# Patient Record
(demographics unavailable — no encounter records)

---

## 2017-10-19 NOTE — RAD
INDICATION: Wrist pain     



COMPARISON: None



TECHNIQUE: AP, lateral, and oblique views were obtained.



FINDINGS: The bony structures, joint spaces, and soft tissues are normal for age.



IMPRESSION: NEGATIVE EXAMINATION.

## 2017-10-19 NOTE — UC
Upper Extremity HPI





- HPI Summary


HPI Summary: 





7:40 AM PULLING LINEN OUT OF CAR. FELT POP IN LEFT SHOULDER, AND LEFT WRIST. 

PAIN WITH MOVEMENT,ALONG SCAPULA,WITH  EXTENSION OF SHOULDER





- History of Current Complaint


Chief Complaint: UCUpperExtremity


Stated Complaint: LEFT SHOULDER INJURY (WC)


Time Seen by Provider: 10/19/17 14:02


Hx Obtained From: Patient


Hx Last Menstrual Period: unknown


Onset/Duration: Sudden Onset, Lasting Hours


Severity Initially: Moderate


Severity Currently: Moderate


Pain Intensity: 8


Pain Scale Used: 0-10 Numeric


Location Of Pain: Is Discrete @ - LEFT SHOUDLER, LEFT WRIST


Character: Spasmodic, Stiffness


Aggravating Factor(s): Movement, Extension, Internal/External Rotation


Alleviating Factor(s): Nothing


Associated Signs And Symptoms: Positive: Negative


Related History: Dominant Hand Right





- Risk Factors


Non-Orthopedic Risk Factor: Negative


DVT Risk Factors: Negative


Septic Arthritis Risk Factor: Negative





- Allergies/Home Medications


Allergies/Adverse Reactions: 


 Allergies











Allergy/AdvReac Type Severity Reaction Status Date / Time


 


No Known Allergies Allergy   Verified 10/19/17 14:00














PMH/Surg Hx/FS Hx/Imm Hx


Previously Healthy: Yes





- Surgical History


Surgical History: Yes


Surgery Procedure, Year, and Place: umbilical hernia repair 2007





- Family History


Known Family History: 


   Negative: Other - NO JOINT LAXITY





- Social History


Occupation: Employed Full-time


Lives: With Family


Alcohol Use: None


Substance Use Type: Excessive Caffeine


Smoking Status (MU): Heavy Every Day Tobacco Smoker


Type: Cigarettes


Amount Used/How Often: 1ppd


Cessation Counseling: Patient Advised to Stop





Review of Systems


Constitutional: Negative


Skin: Negative


Eyes: Negative


ENT: Negative


Respiratory: Negative


Cardiovascular: Negative


Gastrointestinal: Negative


Genitourinary: Negative


Motor: Negative


Neurovascular: Negative


Musculoskeletal: Arthralgia, Myalgia


Neurological: Negative


Psychological: Negative


Is Patient Immunocompromised?: No


All Other Systems Reviewed And Are Negative: Yes





Physical Exam


Triage Information Reviewed: Yes


Appearance: Well-Appearing, No Pain Distress, Well-Nourished


Vital Signs: 


 Initial Vital Signs











Temp  98.2 F   10/19/17 13:55


 


Pulse  88   10/19/17 13:55


 


Resp  14   10/19/17 13:55


 


BP  106/74   10/19/17 13:55


 


Pulse Ox  100   10/19/17 13:55











Vital Signs Reviewed: Yes


Eye Exam: Normal


ENT Exam: Normal


ENT: Positive: Normal ENT inspection


Dental Exam: Normal


Neck exam: Normal


Neck: Positive: Supple, Nontender, No Lymphadenopathy


Respiratory Exam: Normal


Respiratory: Positive: Chest non-tender, Lungs clear, Normal breath sounds, No 

respiratory distress, No accessory muscle use


Cardiovascular Exam: Normal


Cardiovascular: Positive: RRR, No Murmur, Pulses Normal, Brisk Capillary Refill


Abdominal Exam: Normal


Musculoskeletal: Positive: Strength Limited @ - LEFT SHOUDLER, ROM Limited @ - 

LEFT SHOULDER


Neurological Exam: Normal


Psychological Exam: Normal


Skin Exam: Normal





Upper Extremity Course/Dx





- Differential Dx/Diagnosis


Differential Diagnosis/HQI/PQRI: Fracture (Closed), Strain, Sprain


Provider Diagnoses: LEFT WRIST SPRAIN; LEFT SHOULDER SPRAIN





Discharge





- Discharge Plan


Condition: Stable


Disposition: HOME


Patient Education Materials:  Shoulder Sprain (ED), Wrist Sprain (ED)


Forms:  *Work Release


Referrals: 


[facility] PHYSICIAN REFERRAL [Outside]


Ronen Mao MD [Medical Doctor] - If Needed


No Primary Care Phys,NOPCP [Primary Care Provider] - 


Additional Instructions: 


      PHYSICAL THERAPY REFERRAL: 


You have been prescribed physical therapy. Treatments may include stretching, 

exercise, application of heat or cold, and other modalities. After an injury, 

PT can reduce swelling and pain. In recovery, PT is used to restore mobility 

and strength. Your specific treatment goals are:


___X__ Reduction of Swelling (EGS, US, ice as needed)


___X__ Pain Reduction (EGS, US, ice as needed)


___X__ TENS Pack Fitting and Instruction


_____ Wound Hydrotherapy


___X_ Preservation of Mobility


___X__ Restoration of Mobility


____X_ Strength Restoration


___X__ Work or Sports Hardening


This instruction sheet also serves as your PHYSICAL THERAPY REFERRAL! 


Please take it with you to the therapist, so he/she will be aware of your 

diagnosis and treatment plan.


You may see the physical therapist of your choice for these treatments, but may 

wish to check with your insurance to be sure the provider you select is covered.


It's important to see the doctor to whom you have been referred for follow up.

## 2017-10-19 NOTE — RAD
INDICATION: Left shoulder injury     



COMPARISON: None



TECHNIQUE: Routine frontal, Y  and axial views were obtained.



FINDINGS: The bony structures, joint spaces, and soft tissues are normal for age.



IMPRESSION: NEGATIVE EXAMINATION.

## 2018-03-18 NOTE — UC
UC General HPI





- HPI Summary


HPI Summary: 





PT IS CURRENTLY PREGNANT. SHE HAD A RUN OF SVT AND WAS TX WITH IV MEDICATION BY 

THE ER. SHE WAS D/C TO HOME WITH AN EVENT MONITOR X 30 DAYS. TODAY IS THE LAST 

DAY. PT NOTES HER SKIN HAS BECOME WEEPY AND RAW UNDER EACH PATCH. SHE WANTS TO 

KNOW WHAT TO PUT ON THE SITE AFTER REMOVING THOSE PATCHES TONIGHT. NO FEVER. 

SKIN IN BURNING AT EACH SITE.





- History of Current Complaint


Stated Complaint: SKIN ISSUE


Time Seen by Provider: 03/18/18 20:11


Hx Obtained From: Patient


Hx Last Menstrual Period: unknown


Onset/Duration: Gradual Onset


Timing: Constant


Associated Signs & Symptoms: Negative: Fever





- Allergy/Home Medications


Allergies/Adverse Reactions: 


 Allergies











Allergy/AdvReac Type Severity Reaction Status Date / Time


 


No Known Allergies Allergy   Verified 03/18/18 20:14














PMH/Surg Hx/FS Hx/Imm Hx





- Additional Past Medical History


Additional PMH: 





PREGNANT, SVT





- Surgical History


Surgical History: Yes


Surgery Procedure, Year, and Place: umbilical hernia repair 2007





- Family History


Known Family History: 


   Negative: Other - NO JOINT LAXITY





- Social History


Lives: With Family


Alcohol Use: None


Substance Use Type: Excessive Caffeine


Smoking Status (MU): Heavy Every Day Tobacco Smoker


Type: Cigarettes


Amount Used/How Often: 1ppd





- Immunization History


Vaccination Up to Date: Yes





Review of Systems


Constitutional: Negative


Skin: Rash


Eyes: Negative


ENT: Negative


Respiratory: Negative


Cardiovascular: Negative


Gastrointestinal: Negative


Genitourinary: Negative


Motor: Negative


Neurovascular: Negative


Musculoskeletal: Negative


Neurological: Negative


Psychological: Negative


Is Patient Immunocompromised?: No


All Other Systems Reviewed And Are Negative: Yes





Physical Exam


Triage Information Reviewed: Yes


Appearance: Well-Appearing


Vital Signs Reviewed: Yes


Eyes: Positive: Conjunctiva Clear


ENT: Positive: Normal ENT inspection


Neck: Positive: Supple, Nontender, No Lymphadenopathy


Respiratory: Positive: Lungs clear, Normal breath sounds


Cardiovascular: Positive: RRR, No Murmur


Abdomen Description: Positive: Nontender, Soft


Bowel Sounds: Positive: Present


Musculoskeletal: Positive: ROM Intact


Neurological: Positive: Alert


Psychological: Positive: Age Appropriate Behavior


Skin Exam: Normal


Skin: Positive: Other - Pt has 3 electrode patches on her L trunk and one on R 

upper trunk. Underneath each site in the shape of the patch, the skin is pink 

and weepy but no malodor, warmth or exudate. The rash does not involve anywhere 

but where there is adhesive contact.





Course/Dx





- Course


Course Of Treatment: exam is c/w a contact dermatitis. no sign of skin 

infection. will tx with topical steroid and close f/u for recheck.





- Differential Dx - Multi-Symptom


Provider Diagnoses: Contact dermatitis





Discharge





- Discharge Plan


Condition: Stable


Disposition: HOME


Prescriptions: 


Triamcinolone 0.1% CREAM (NF) [Kenalog 0.1% Cream (NF)] 1 applic TOPICAL BID 5 

Days #1 tube


Patient Education Materials:  Contact Dermatitis (DC)


Referrals: 


Cristina Lovett MD [Primary Care Provider] - 3 Days


Additional Instructions: 


RINSE THE AREAS WITH GENTLE SOAP AND WATER. PAT DRY THEN APPLY A THIN LAYER OF 

THE STEROID CREAM TWICE DAILY FOR 5 DAYS.